# Patient Record
Sex: FEMALE | Race: BLACK OR AFRICAN AMERICAN | Employment: FULL TIME | URBAN - METROPOLITAN AREA
[De-identification: names, ages, dates, MRNs, and addresses within clinical notes are randomized per-mention and may not be internally consistent; named-entity substitution may affect disease eponyms.]

---

## 2017-01-03 ENCOUNTER — DOCUMENTATION ONLY (OUTPATIENT)
Dept: NEUROLOGY | Age: 32
End: 2017-01-03

## 2017-06-21 ENCOUNTER — OFFICE VISIT (OUTPATIENT)
Dept: FAMILY MEDICINE CLINIC | Age: 32
End: 2017-06-21

## 2017-06-21 VITALS
SYSTOLIC BLOOD PRESSURE: 110 MMHG | WEIGHT: 215 LBS | DIASTOLIC BLOOD PRESSURE: 68 MMHG | BODY MASS INDEX: 36.7 KG/M2 | OXYGEN SATURATION: 97 % | TEMPERATURE: 97.6 F | HEIGHT: 64 IN | RESPIRATION RATE: 18 BRPM | HEART RATE: 59 BPM

## 2017-06-21 DIAGNOSIS — R10.84 GENERALIZED ABDOMINAL PAIN: ICD-10-CM

## 2017-06-21 DIAGNOSIS — R19.7 ACUTE DIARRHEA: Primary | ICD-10-CM

## 2017-06-21 LAB
BILIRUB UR QL STRIP: NEGATIVE
GLUCOSE UR-MCNC: NEGATIVE MG/DL
HCG URINE, QL. (POC): NEGATIVE
KETONES P FAST UR STRIP-MCNC: NEGATIVE MG/DL
PH UR STRIP: 7.5 [PH] (ref 4.6–8)
PROT UR QL STRIP: NEGATIVE MG/DL
SP GR UR STRIP: 1.01 (ref 1–1.03)
UA UROBILINOGEN AMB POC: NORMAL (ref 0.2–1)
URINALYSIS CLARITY POC: CLEAR
URINALYSIS COLOR POC: YELLOW
URINE BLOOD POC: NEGATIVE
URINE LEUKOCYTES POC: NORMAL
URINE NITRITES POC: NEGATIVE
VALID INTERNAL CONTROL?: YES

## 2017-06-21 NOTE — LETTER
NOTIFICATION RETURN TO WORK / SCHOOL 
 
6/21/2017 11:18 AM 
 
Ms. Dylan Amador [address] [place] [address] [address] [facility] To Whom It May Concern: 
 
Dylan Amador is currently under the care of [facility]. She will return to work/school on: 6/22/17 If there are questions or concerns please have the patient contact our office.  
 
 
 
Sincerely, 
 
 
OLGA LIDIA Vaughn
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
## 2017-07-25 ENCOUNTER — APPOINTMENT (OUTPATIENT)
Dept: GENERAL RADIOLOGY | Age: 32
End: 2017-07-25
Attending: PHYSICIAN ASSISTANT
Payer: SELF-PAY

## 2017-07-25 ENCOUNTER — HOSPITAL ENCOUNTER (EMERGENCY)
Age: 32
Discharge: HOME | End: 2017-07-25
Attending: EMERGENCY MEDICINE
Payer: SELF-PAY

## 2017-07-25 VITALS
TEMPERATURE: 98.6 F | OXYGEN SATURATION: 99 % | SYSTOLIC BLOOD PRESSURE: 110 MMHG | HEART RATE: 54 BPM | DIASTOLIC BLOOD PRESSURE: 74 MMHG | RESPIRATION RATE: 18 BRPM

## 2017-07-25 DIAGNOSIS — K59.00 CONSTIPATION, UNSPECIFIED CONSTIPATION TYPE: Primary | ICD-10-CM

## 2017-07-25 LAB
ALBUMIN SERPL BCP-MCNC: 3.5 G/DL (ref 3.4–5)
ALBUMIN/GLOB SERPL: 0.7 {RATIO} (ref 0.8–1.7)
ALP SERPL-CCNC: 130 U/L (ref 45–117)
ALT SERPL-CCNC: 39 U/L (ref 13–56)
ANION GAP BLD CALC-SCNC: 5 MMOL/L (ref 3–18)
APPEARANCE UR: CLEAR
AST SERPL W P-5'-P-CCNC: 27 U/L (ref 15–37)
BASOPHILS # BLD AUTO: 0 K/UL (ref 0–0.06)
BASOPHILS # BLD: 0 % (ref 0–3)
BILIRUB SERPL-MCNC: 0.3 MG/DL (ref 0.2–1)
BILIRUB UR QL: NEGATIVE
BUN SERPL-MCNC: 6 MG/DL (ref 7–18)
BUN/CREAT SERPL: 5 (ref 12–20)
CALCIUM SERPL-MCNC: 8.7 MG/DL (ref 8.5–10.1)
CHLORIDE SERPL-SCNC: 107 MMOL/L (ref 100–108)
CO2 SERPL-SCNC: 25 MMOL/L (ref 21–32)
COLOR UR: YELLOW
CREAT SERPL-MCNC: 1.15 MG/DL (ref 0.6–1.3)
DIFFERENTIAL METHOD BLD: ABNORMAL
EOSINOPHIL # BLD: 0.2 K/UL (ref 0–0.4)
EOSINOPHIL NFR BLD: 3 % (ref 0–5)
ERYTHROCYTE [DISTWIDTH] IN BLOOD BY AUTOMATED COUNT: 15.7 % (ref 11.6–14.5)
GLOBULIN SER CALC-MCNC: 5.3 G/DL (ref 2–4)
GLUCOSE SERPL-MCNC: 96 MG/DL (ref 74–99)
GLUCOSE UR STRIP.AUTO-MCNC: NEGATIVE MG/DL
HCG UR QL: NEGATIVE
HCT VFR BLD AUTO: 32 % (ref 35–45)
HGB BLD-MCNC: 9.8 G/DL (ref 12–16)
HGB UR QL STRIP: NEGATIVE
KETONES UR QL STRIP.AUTO: NEGATIVE MG/DL
LEUKOCYTE ESTERASE UR QL STRIP.AUTO: NEGATIVE
LIPASE SERPL-CCNC: 120 U/L (ref 73–393)
LYMPHOCYTES # BLD AUTO: 40 % (ref 20–51)
LYMPHOCYTES # BLD: 2.1 K/UL (ref 0.8–3.5)
MCH RBC QN AUTO: 21.9 PG (ref 24–34)
MCHC RBC AUTO-ENTMCNC: 30.6 G/DL (ref 31–37)
MCV RBC AUTO: 71.6 FL (ref 74–97)
MONOCYTES # BLD: 0.3 K/UL (ref 0–1)
MONOCYTES NFR BLD AUTO: 6 % (ref 2–9)
NEUTS SEG # BLD: 2.7 K/UL (ref 1.8–8)
NEUTS SEG NFR BLD AUTO: 51 % (ref 42–75)
NITRITE UR QL STRIP.AUTO: NEGATIVE
PH UR STRIP: 7.5 [PH] (ref 5–8)
PLATELET # BLD AUTO: 445 K/UL (ref 135–420)
PLATELET COMMENTS,PCOM: ABNORMAL
PMV BLD AUTO: 8.8 FL (ref 9.2–11.8)
POTASSIUM SERPL-SCNC: 3.9 MMOL/L (ref 3.5–5.5)
PROT SERPL-MCNC: 8.8 G/DL (ref 6.4–8.2)
PROT UR STRIP-MCNC: NEGATIVE MG/DL
RBC # BLD AUTO: 4.47 M/UL (ref 4.2–5.3)
RBC MORPH BLD: ABNORMAL
SODIUM SERPL-SCNC: 137 MMOL/L (ref 136–145)
SP GR UR REFRACTOMETRY: 1.01 (ref 1–1.03)
UROBILINOGEN UR QL STRIP.AUTO: 0.2 EU/DL (ref 0.2–1)
WBC # BLD AUTO: 5.3 K/UL (ref 4.6–13.2)

## 2017-07-25 PROCEDURE — 85025 COMPLETE CBC W/AUTO DIFF WBC: CPT | Performed by: PHYSICIAN ASSISTANT

## 2017-07-25 PROCEDURE — 81025 URINE PREGNANCY TEST: CPT | Performed by: PHYSICIAN ASSISTANT

## 2017-07-25 PROCEDURE — 80053 COMPREHEN METABOLIC PANEL: CPT | Performed by: PHYSICIAN ASSISTANT

## 2017-07-25 PROCEDURE — 81003 URINALYSIS AUTO W/O SCOPE: CPT | Performed by: PHYSICIAN ASSISTANT

## 2017-07-25 PROCEDURE — 99283 EMERGENCY DEPT VISIT LOW MDM: CPT

## 2017-07-25 PROCEDURE — 83690 ASSAY OF LIPASE: CPT | Performed by: PHYSICIAN ASSISTANT

## 2017-07-25 PROCEDURE — 74000 XR ABD (KUB): CPT

## 2017-07-25 RX ORDER — DOCUSATE SODIUM 100 MG/1
100 CAPSULE, LIQUID FILLED ORAL
Qty: 30 CAP | Refills: 0 | Status: SHIPPED | OUTPATIENT
Start: 2017-07-25 | End: 2017-08-24

## 2017-07-25 NOTE — LETTER
[address] [place] [address] [address] [facility] EMERGENCY DEPT 
[address] [facility] [address] 
[phone] Work/School Note Date: 7/25/2017 To Whom It May concern: 
 
Chapincito Kennedy was seen and treated today in the emergency room by the following provider(s): 
Attending Provider: Lakeshia Nice MD 
Physician Assistant: OLGA LIDIA Dupree. Chapincito Kennedy may return to work on 7/26/17. Sincerely, OLGA LIDIA Dupree
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
## 2017-07-25 NOTE — ED TRIAGE NOTES
Patient to ED with c/o abdominal pain and nausea for one day. Ambulatory on arrival. Rates pain 6/10. NKDA.

## 2017-07-25 NOTE — DISCHARGE INSTRUCTIONS
Constipation: Care Instructions  Your Care Instructions  Constipation means that you have a hard time passing stools (bowel movements). People pass stools from 3 times a day to once every 3 days. What is normal for you may be different. Constipation may occur with pain in the rectum and cramping. The pain may get worse when you try to pass stools. Sometimes there are small amounts of bright red blood on toilet paper or the surface of stools. This is because of enlarged veins near the rectum (hemorrhoids). A few changes in your diet and lifestyle may help you avoid ongoing constipation. Your doctor may also prescribe medicine to help loosen your stool. Some medicines can cause constipation. These include pain medicines and antidepressants. Tell your doctor about all the medicines you take. Your doctor may want to make a medicine change to ease your symptoms. Follow-up care is a key part of your treatment and safety. Be sure to make and go to all appointments, and call your doctor if you are having problems. It's also a good idea to know your test results and keep a list of the medicines you take. How can you care for yourself at home? · Drink plenty of fluids, enough so that your urine is light yellow or clear like water. If you have kidney, heart, or liver disease and have to limit fluids, talk with your doctor before you increase the amount of fluids you drink. · Include high-fiber foods in your diet each day. These include fruits, vegetables, beans, and whole grains. · Get at least 30 minutes of exercise on most days of the week. Walking is a good choice. You also may want to do other activities, such as running, swimming, cycling, or playing tennis or team sports. · Take a fiber supplement, such as Citrucel or Metamucil, every day. Read and follow all instructions on the label. · Schedule time each day for a bowel movement. A daily routine may help.  Take your time having your bowel movement. · Support your feet with a small step stool when you sit on the toilet. This helps flex your hips and places your pelvis in a squatting position. · Your doctor may recommend an over-the-counter laxative to relieve your constipation. Examples are Milk of Magnesia and MiraLax. Read and follow all instructions on the label. Do not use laxatives on a long-term basis. When should you call for help? Call your doctor now or seek immediate medical care if:  · You have new or worse belly pain. · You have new or worse nausea or vomiting. · You have blood in your stools. Watch closely for changes in your health, and be sure to contact your doctor if:  · Your constipation is getting worse. · You do not get better as expected. Where can you learn more? Go to http://grant-myriam.info/. Enter 21 [phone] in the search box to learn more about \"Constipation: Care Instructions. \"  Current as of: March 20, 2017  Content Version: 11.3  © 4588-3131 [employer]. Care instructions adapted under license by [employer] (which disclaims liability or warranty for this information). If you have questions about a medical condition or this instruction, always ask your healthcare professional. [place] [address] any warranty or liability for your use of this information.

## 2017-08-21 ENCOUNTER — OFFICE VISIT (OUTPATIENT)
Dept: OBGYN CLINIC | Age: 32
End: 2017-08-21

## 2017-08-21 VITALS
SYSTOLIC BLOOD PRESSURE: 117 MMHG | HEIGHT: 64 IN | WEIGHT: 213 LBS | DIASTOLIC BLOOD PRESSURE: 70 MMHG | HEART RATE: 76 BPM | BODY MASS INDEX: 36.37 KG/M2

## 2017-08-21 DIAGNOSIS — T83.32XA MALPOSITIONED IUD, INITIAL ENCOUNTER: Primary | ICD-10-CM

## 2017-08-21 NOTE — PROGRESS NOTES
This is a 16-year-old female  2 para 2 with normal menstrual cycles who presents for IUD removal.  The patient had a ParaGard IUD inserted 7 years ago. She states that prior to the insertion of the ParaGard she had undergone a LEEP procedure on her cervix for abnormal cells. A few years ago she was told that the IUD had changed position but it was not removed and she stated that she was having no problems with it. She denies any severe pelvic pain, chills, fever or abnormal vaginal discharge at this time. Past medical history was reviewed. Vital signs are stable. She is a well-developed well-nourished female in no apparent distress. Abdomen was soft and nontender. External genitalia unremarkable. Vaginal canal revealed no significant discharge or blood. Cervix appeared to have no lesions and was parous. An IUD string was visible. Uterus was normal size but slightly retroflexed and nontender. Adnexa unremarkable   Should be mentioned the patient was consented for IUD removal.  The IUD string was grabbed with ring forceps and there was significant resistance to the point that the IUD string broke. Small hemostat was then inserted into the endocervical canal and a another portion of the string was grabbed. However there was continued resistance and this part of the string broke as well. Became obvious that this IUD was incarcerated in someway and therefore the procedure was terminated. Patient was in stable condition. Incarcerated IUD possibly secondary to cervical adhesions from LEEP or an embedded arm in the myometrium. We will also need to rule out perforation. Patient will get a pelvic ultrasound to locate position of IUD. She will then be scheduled for an elective hysteroscopy with IUD removal as long as there is no evidence of perforation.

## 2017-08-21 NOTE — PATIENT INSTRUCTIONS
IUD Removal: Care Instructions  Your Care Instructions    The intrauterine device (IUD) is a method of birth control. It is a small, plastic, T-shaped device that contains copper or hormones. It is placed in your uterus. You may have had your IUD removed because you want to become pregnant. Or maybe it caused pain, bleeding, or an infection. You may have chosen another method of birth control. If you don't want to get pregnant, make sure to use another form of birth control now that your IUD is not in place. Talk to your doctor about other forms of birth control. Follow-up care is a key part of your treatment and safety. Be sure to make and go to all appointments, and call your doctor if you are having problems. It's also a good idea to know your test results and keep a list of the medicines you take. How can you care for yourself at home? · IUD removal does not usually cause any pain or problems if the IUD is removed because you want to become pregnant or because of bleeding. · Once the IUD is taken out, you can become pregnant. If you want to become pregnant, you can start trying to have a baby as soon as you like. · If your doctor prescribed antibiotics because of an infection, take them as directed. Do not stop taking them just because you feel better. You need to take the full course of antibiotics. When should you call for help? Call 911 anytime you think you may need emergency care. For example, call if:  · You passed out (lost consciousness). Call your doctor now or seek immediate medical care if:  · You have severe vaginal bleeding. This means that you are soaking through your usual pads or tampons every hour for 2 or more hours and passing clots of blood. · You have a fever. · You feel sick to your stomach, or you vomit. · You have new or worse pelvic pain. · You have new or worse belly pain. · You are dizzy or lightheaded, or you feel like you may faint.   Watch closely for changes in your health, and be sure to contact your doctor if you have any problems. Where can you learn more? Go to http://grant-myriam.info/. Enter S642 in the search box to learn more about \"IUD Removal: Care Instructions. \"  Current as of: March 16, 2017  Content Version: 11.3  © 9781-2528 [employer]. Care instructions adapted under license by [employer] (which disclaims liability or warranty for this information). If you have questions about a medical condition or this instruction, always ask your healthcare professional. [address] any warranty or liability for your use of this information.

## 2017-08-25 ENCOUNTER — HOSPITAL ENCOUNTER (OUTPATIENT)
Dept: ULTRASOUND IMAGING | Age: 32
Discharge: HOME | End: 2017-08-25
Attending: OBSTETRICS & GYNECOLOGY
Payer: SELF-PAY

## 2017-08-25 DIAGNOSIS — T83.32XA MALPOSITIONED IUD, INITIAL ENCOUNTER: ICD-10-CM

## 2017-08-25 PROCEDURE — 76830 TRANSVAGINAL US NON-OB: CPT

## 2017-09-09 ENCOUNTER — HOSPITAL ENCOUNTER (EMERGENCY)
Age: 32
Discharge: HOME | End: 2017-09-09
Attending: EMERGENCY MEDICINE
Payer: SELF-PAY

## 2017-09-09 VITALS
OXYGEN SATURATION: 100 % | TEMPERATURE: 98.1 F | BODY MASS INDEX: 30.53 KG/M2 | HEIGHT: 66 IN | HEART RATE: 77 BPM | WEIGHT: 190 LBS | RESPIRATION RATE: 16 BRPM | DIASTOLIC BLOOD PRESSURE: 86 MMHG | SYSTOLIC BLOOD PRESSURE: 123 MMHG

## 2017-09-09 DIAGNOSIS — K08.89 TOOTHACHE: Primary | ICD-10-CM

## 2017-09-09 PROCEDURE — 99282 EMERGENCY DEPT VISIT SF MDM: CPT

## 2017-09-09 RX ORDER — PENICILLIN V POTASSIUM 500 MG/1
500 TABLET, FILM COATED ORAL 4 TIMES DAILY
Qty: 28 TAB | Refills: 0 | Status: SHIPPED | OUTPATIENT
Start: 2017-09-09 | End: 2017-09-16

## 2017-09-09 RX ORDER — HYDROCODONE BITARTRATE AND ACETAMINOPHEN 5; 325 MG/1; MG/1
TABLET ORAL
Qty: 6 TAB | Refills: 0 | Status: SHIPPED | OUTPATIENT
Start: 2017-09-09 | End: 2019-09-27

## 2017-09-10 NOTE — DISCHARGE INSTRUCTIONS
Tooth and Gum Pain: Care Instructions  Your Care Instructions    The most common causes of dental pain are tooth decay and gum disease. Pain can also be caused by an infection of the tooth (abscess) or the gums. Or you may have pain from a broken or cracked tooth. Other causes of pain include infection and damage to a tooth from nervous grinding of your teeth. A wisdom tooth can be painful when it is coming in but cannot break through the gum. It can also be painful when the tooth is only partway in and extra gum tissue has formed around it. The tissue can get inflamed (pericoronitis), and sometimes it gets infected. Prompt dental care can help find the cause of your toothache and keep the tooth from dying or gum disease from getting worse. Self-care at home may reduce your pain and discomfort. Follow-up care is a key part of your treatment and safety. Be sure to make and go to all appointments, and call your dentist or doctor if you are having problems. It's also a good idea to know your test results and keep a list of the medicines you take. How can you care for yourself at home? · To reduce pain and facial swelling, put an ice or cold pack on the outside of your cheek for 10 to 20 minutes at a time. Put a thin cloth between the ice and your skin. Do not use heat. · If your doctor prescribed antibiotics, take them as directed. Do not stop taking them just because you feel better. You need to take the full course of antibiotics. · Ask your doctor if you can take an over-the-counter pain medicine, such as acetaminophen (Tylenol), ibuprofen (Advil, Motrin), or naproxen (Aleve). Be safe with medicines. Read and follow all instructions on the label. · Avoid very hot, cold, or sweet foods and drinks if they increase your pain. · Rinse your mouth with warm salt water every 2 hours to help relieve pain and swelling. Mix 1 teaspoon of salt in 8 ounces of water.   · Talk to your dentist about using special toothpaste for sensitive teeth. To reduce pain on contact with heat or cold or when brushing, brush with this toothpaste regularly or rub a small amount of the paste on the sensitive area with a clean finger 2 or 3 times a day. Floss gently between your teeth. · Do not smoke or use spit tobacco. Tobacco use can make gum problems worse, decreases your ability to fight infection in your gums, and delays healing. If you need help quitting, talk to your doctor about stop-smoking programs and medicines. These can increase your chances of quitting for good. When should you call for help? Call 911 anytime you think you may need emergency care. For example, call if:  · You have trouble breathing. Call your dentist or doctor now or seek immediate medical care if:  · You have signs of infection, such as:  ¨ Increased pain, swelling, warmth, or redness. ¨ Red streaks leading from the area. ¨ Pus draining from the area. ¨ A fever. Watch closely for changes in your health, and be sure to contact your doctor if:  · You do not get better as expected. Where can you learn more? Go to http://grant-myriam.info/. Enter 0363 [id number] in the search box to learn more about \"Tooth and Gum Pain: Care Instructions. \"  Current as of: August 11, 2016  Content Version: 11.3  © 0888-1922 [employer]. Care instructions adapted under license by [employer] (which disclaims liability or warranty for this information). If you have questions about a medical condition or this instruction, always ask your healthcare professional. [place] [address] any warranty or liability for your use of this information.
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
## 2019-07-10 ENCOUNTER — OFFICE VISIT (OUTPATIENT)
Dept: FAMILY MEDICINE CLINIC | Age: 34
End: 2019-07-10

## 2019-07-10 VITALS
RESPIRATION RATE: 16 BRPM | BODY MASS INDEX: 34.42 KG/M2 | OXYGEN SATURATION: 99 % | HEIGHT: 66 IN | HEART RATE: 73 BPM | WEIGHT: 214.2 LBS | SYSTOLIC BLOOD PRESSURE: 108 MMHG | TEMPERATURE: 97.2 F | DIASTOLIC BLOOD PRESSURE: 64 MMHG

## 2019-07-10 DIAGNOSIS — Z34.90 PREGNANCY, UNSPECIFIED GESTATIONAL AGE: ICD-10-CM

## 2019-07-10 DIAGNOSIS — N92.6 IRREGULAR MENSES: Primary | ICD-10-CM

## 2019-07-10 LAB
HCG URINE, QL. (POC): POSITIVE
VALID INTERNAL CONTROL?: YES
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
## 2019-07-10 NOTE — PROGRESS NOTES
Chief Complaint   Patient presents with   [facility] Establish Care    Irregular Menses     Last menstrual cycle was \"maybe at the end of April\". Patient is not sure. 1. Have you been to the ER, urgent care clinic since your last visit? Hospitalized since your last visit? NO    2. Have you seen or consulted any other health care providers outside of the [address] [place] [address] since your last visit? Include any pap smears or colon screening.  No

## 2019-12-20 PROBLEM — Z34.90 TERM PREGNANCY: Status: ACTIVE | Noted: 2019-12-20

## 2019-12-20 PROBLEM — O41.03X1 OLIGOHYDRAMNIOS ANTEPARTUM, THIRD TRIMESTER, FETUS 1: Status: ACTIVE | Noted: 2019-12-20

## 2020-01-07 ENCOUNTER — OFFICE VISIT (OUTPATIENT)
Dept: ORTHOPEDIC SURGERY | Age: 35
End: 2020-01-07

## 2020-01-07 ENCOUNTER — HOSPITAL ENCOUNTER (OUTPATIENT)
Dept: OCCUPATIONAL MEDICINE | Age: 35
Discharge: HOME | End: 2020-01-07
Attending: FAMILY MEDICINE

## 2020-01-07 VITALS
DIASTOLIC BLOOD PRESSURE: 105 MMHG | HEART RATE: 67 BPM | WEIGHT: 191.8 LBS | SYSTOLIC BLOOD PRESSURE: 163 MMHG | HEIGHT: 66 IN | RESPIRATION RATE: 15 BRPM | TEMPERATURE: 97.6 F | BODY MASS INDEX: 30.82 KG/M2

## 2020-01-07 DIAGNOSIS — M22.2X2 PATELLOFEMORAL DISORDER OF BOTH KNEES: ICD-10-CM

## 2020-01-07 DIAGNOSIS — M22.2X1 PATELLOFEMORAL DISORDER OF BOTH KNEES: Primary | ICD-10-CM

## 2020-01-07 DIAGNOSIS — M22.2X2 PATELLOFEMORAL DISORDER OF BOTH KNEES: Primary | ICD-10-CM

## 2020-01-07 DIAGNOSIS — M22.2X1 PATELLOFEMORAL DISORDER OF BOTH KNEES: ICD-10-CM

## 2020-01-07 RX ORDER — MELOXICAM 15 MG/1
TABLET ORAL
Qty: 90 TAB | Refills: 0 | Status: SHIPPED | OUTPATIENT
Start: 2020-01-07
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
## 2020-01-07 NOTE — PATIENT INSTRUCTIONS
Follow-up on referral to physical therapy, perform home exercises daily, use medication as prescribed, and return to the office in about 6 weeks.      Search YouTube for my channel:    Dr. Cari Avila

## 2020-01-17 ENCOUNTER — HOSPITAL ENCOUNTER (OUTPATIENT)
Dept: PHYSICAL THERAPY | Age: 35
Discharge: HOME | End: 2020-01-17
Payer: COMMERCIAL

## 2020-01-17 PROCEDURE — 97161 PT EVAL LOW COMPLEX 20 MIN: CPT

## 2020-01-17 PROCEDURE — 97110 THERAPEUTIC EXERCISES: CPT

## 2020-01-17 PROCEDURE — 97535 SELF CARE MNGMENT TRAINING: CPT

## 2020-01-17 NOTE — PROGRESS NOTES
PT DAILY TREATMENT NOTE  10-18    Patient Name: Johnny Avalos  Date:2020  : 1985  [x]  Patient  Verified  Payor: Shabnam Rodriguez / Plan: Viraj Pruitt / Product Type: HMO /    In time:12:10  Out time:12:54  Total Treatment Time (min): 40  Visit #: 1 of 10    Medicare/BCBS Only   Total Timed Codes (min):  23 1:1 Treatment Time:  44     Treatment Area: Patellofemoral disorders, right knee [M22.2X1]  Patellofemoral disorders, left knee [M22.2X2]    SUBJECTIVE  Pain Level (0-10 scale): 5  Any medication changes, allergies to medications, adverse drug reactions, diagnosis change, or new procedure performed?: [x] No    [] Yes (see summary sheet for update)  Subjective functional status/changes:   [] No changes reported  See POC    OBJECTIVE    21 min [x]Eval                  []Re-Eval     13 min Therapeutic Exercise:  [] See flow sheet : HEP instruction and demonstration   Rationale: increase ROM and increase strength to improve the patients ability to tolerate ADLs    10 min Self Care/Home Management: []  See flow sheet : pt education regarding anatomy and physiology of the LEs and how it relates to the pt's condition, pt education on contacting the MD if her numbness/tingling symptoms worsen   Rationale: increase ROM, increase strength and decrease pain/symptoms  to improve the patients ability to tolerate functional tasks. With   [x] TE   [] TA   [] neuro   [x] Other: Self Care/Home management Patient Education: [x] Review HEP    [] Progressed/Changed HEP based on:   [] positioning   [] body mechanics   [] transfers   [] heat/ice application    [] other:      Other Objective/Functional Measures: See evaluation. Pain Level (0-10 scale) post treatment: 5    ASSESSMENT/Changes in Function: Pt given HEP handout to perform. Pt understood exercises in HEP handout. Pt demonstrated decreased AROM, decreased strength, and tenderness to palpation.  Significant limitations with B knee flex AROM (100 degs bilaterally) secondary to pain in the low back and hip/thighs. PROM left knee flex 115 degs (limited by pain) and right knee flex 130 degs (limited by pain). Pelvic alignment and leg length WNL. Pt reported having increased left foot numbness post evaluation today but could not state a specific objective measure that increased this numbness when asked by the therapist. Pt seemed to have trouble with describing her pain/symptoms and numbness with objective measurements today. No increased laxity noted in B knees with B varus/valgus at 0/30 degs, anterior/posterior drawer tests. Cannot rule out possible B hip/low back involvement secondary to pt's response to objective measures today. Pt would benefit from physical therapy to improve the above impairments to help the pt return to performing ADLs, functional and work activities. Patient will continue to benefit from skilled PT services to modify and progress therapeutic interventions, address functional mobility deficits, address ROM deficits, address strength deficits, analyze and address soft tissue restrictions, analyze and cue movement patterns, analyze and modify body mechanics/ergonomics, address imbalance/dizziness and instruct in home and community integration to attain remaining goals. [x]  See Plan of Care  []  See progress note/recertification  []  See Discharge Summary         Progress towards goals / Updated goals:  Short Term Goals: To be accomplished in 2 treatments:  1. Pt will report compliance and independence to HEP to help the pt manage their pain and symptoms. Eval: established   Long Term Goals: To be accomplished in 10 treatments:  1. Pt will increase FOTO score to 55 points to improve ability to perform ADLs. Eval: 50 points  2. Pt will report a decrease in at worst pain to 3/10 to improve ability to perform work activities with more ease. Eval: 6/10 at worst  3.  Pt will increase AROM B knee flex to 115 degs to improve ability to ascend/descend stairs with more ease. Eval: 100 degs for both knees (limited by pain in the hips, thighs, and back)  4. Pt will report being limited a little with walking a mile to improve ability tolerance to community ambulation.   Eval: Limited a lot per FOTO    PLAN  [x]  Upgrade activities as tolerated     [x]  Continue plan of care  [x]  Update interventions per flow sheet       []  Discharge due to:_  []  Other:_      Tylor Coppola PT 1/17/2020  12:57 PM    Future Appointments   Date Time Provider Hattie Glover   1/17/2020 12:00 PM Deborah Dolan, PT [id number]   2/18/2020 10:00 AM [place], [address] [facility]
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
## 2022-04-13 NOTE — PROGRESS NOTES
[facility]  [facility]  [address] [place] [address]  [phone] fax    Plan of Care/ Statement of Necessity for Physical Therapy Services  Patient name: Eric Austin Start of Care: 2020   Referral source: Tyron Rocha DO : 1985    Medical Diagnosis: Patellofemoral disorders, right knee [M22.2X1]  Patellofemoral disorders, left knee [M22.2X2]  Payor: Yoni Krishna / Plan: Kt De / Product Type: HMO /  Onset Date: as a child and in 2007    Treatment Diagnosis: B knee pain   Prior Hospitalization: see medical history Provider#: [id number]   Medications: Verified on Patient summary List    Comorbidities: depression, hx of previous accidents with LBP   Prior Level of Function: Independent with ADLs, functional, and work tasks with intermittent pain in the B knees. The Plan of Care and following information is based on the information from the initial evaluation. Assessment/ key information:   Pt is a 27year old female who presents to therapy today with B knee pain. Pt states that her initial symptoms began when she was a child when she \"ran into someone\" when playing volleyball. Pt reports re-injuring her knees in  when she was zip-lining at boot camp and fell onto her left hip and LE. Pt reports worsening of symptoms since and states she has noticed increased numbness/tingling in her B feet and lower LEs to the thighs over the past few days. Pt denies any recent trauma or injury and denies saddle paraesthesias. Pt states that the numbness/tingling is better today but it is still present. Pt reports having increased pain with walking, stairs and with sitting. Pt demonstrated decreased AROM, decreased strength, and tenderness to palpation. Significant limitations with B knee flex AROM (100 degs bilaterally) secondary to pain in the low back and hip/thighs.  PROM left knee flex 115 degs (limited by pain) and right knee flex 130 degs (limited by pain). Pelvic alignment and leg length WNL. Pt reported having increased left foot numbness post evaluation today but could not state a specific objective measure that increased this numbness when asked by the therapist. Pt seemed to have trouble with describing her pain/symptoms and numbness with objective measurements today. No increased laxity noted in B knees with B varus/valgus at 0/30 degs, anterior/posterior drawer tests. Cannot rule out possible B hip/low back involvement secondary to pt's response to objective measures today. Pt would benefit from physical therapy to improve the above impairments to help the pt return to performing ADLs, functional and work activities.      Evaluation Complexity History MEDIUM  Complexity : 1-2 comorbidities / personal factors will impact the outcome/ POC ; Examination HIGH Complexity : 4+ Standardized tests and measures addressing body structure, function, activity limitation and / or participation in recreation  ;Presentation LOW Complexity : Stable, uncomplicated  ;Clinical Decision Making MEDIUM Complexity : FOTO score of 26-74  Overall Complexity Rating: LOW   Problem List: pain affecting function, decrease ROM, decrease strength, edema affecting function, impaired gait/ balance, decrease ADL/ functional abilitiies, decrease activity tolerance, decrease flexibility/ joint mobility and decrease transfer abilities   Treatment Plan may include any combination of the following: Therapeutic exercise, Therapeutic activities, Neuromuscular re-education, Physical agent/modality, Gait/balance training, Manual therapy, Patient education, Self Care training, Functional mobility training, Home safety training and Stair training  Patient / Family readiness to learn indicated by: asking questions, trying to perform skills and interest  Persons(s) to be included in education: patient (P)  Barriers to Learning/Limitations: None  Patient Goal (s): no more pain  Patient Self Reported Health Status: good  Rehabilitation Potential: fair    Short Term Goals: To be accomplished in 2 treatments:  1. Pt will report compliance and independence to [facility] to help the pt manage their pain and symptoms. Eval: established   Long Term Goals: To be accomplished in 10 treatments:  1. Pt will increase FOTO score to 55 points to improve ability to perform ADLs. Eval: 50 points  2. Pt will report a decrease in at worst pain to 3/10 to improve ability to perform work activities with more ease. Eval: 6/10 at worst  3. Pt will increase AROM B knee flex to 115 degs to improve ability to ascend/descend stairs with more ease. Eval: 100 degs for both knees (limited by pain in the hips, thighs, and back)  4. Pt will report being limited a little with walking a mile to improve ability tolerance to community ambulation. Eval: Limited a lot per FOTO    Frequency / Duration: Patient to be seen 2 times per week for 10 treatments. Patient/ Caregiver education and instruction: Diagnosis, prognosis, self care, activity modification and exercises   [x]  Plan of care has been reviewed with JETT Duckworth PT 1/17/2020 10:38 AM  _____________________________________________________________________  I certify that the above Therapy Services are being furnished while the patient is under my care. I agree with the treatment plan and certify that this therapy is necessary.     Physician's Signature:____________________  Date:__________Time:______    Please sign and return to [facility] [address] [facility]  [place], [facility]  [phone] fax Repair Hemostasis (Optional): Electrocautery